# Patient Record
Sex: MALE | Race: ASIAN | NOT HISPANIC OR LATINO
[De-identification: names, ages, dates, MRNs, and addresses within clinical notes are randomized per-mention and may not be internally consistent; named-entity substitution may affect disease eponyms.]

---

## 2017-02-27 ENCOUNTER — APPOINTMENT (OUTPATIENT)
Dept: HEART AND VASCULAR | Facility: CLINIC | Age: 42
End: 2017-02-27

## 2018-02-05 ENCOUNTER — APPOINTMENT (OUTPATIENT)
Dept: HEART AND VASCULAR | Facility: CLINIC | Age: 43
End: 2018-02-05

## 2018-03-09 ENCOUNTER — TRANSCRIPTION ENCOUNTER (OUTPATIENT)
Age: 43
End: 2018-03-09

## 2018-03-09 ENCOUNTER — APPOINTMENT (OUTPATIENT)
Dept: HEART AND VASCULAR | Facility: CLINIC | Age: 43
End: 2018-03-09
Payer: COMMERCIAL

## 2018-03-09 DIAGNOSIS — D15.0 BENIGN NEOPLASM OF THYMUS: ICD-10-CM

## 2018-03-09 PROCEDURE — 99214 OFFICE O/P EST MOD 30 MIN: CPT

## 2018-03-12 PROBLEM — D15.0 THYMOMA: Status: ACTIVE | Noted: 2017-02-27

## 2018-03-29 ENCOUNTER — OUTPATIENT (OUTPATIENT)
Dept: OUTPATIENT SERVICES | Facility: HOSPITAL | Age: 43
LOS: 1 days | End: 2018-03-29

## 2018-03-29 ENCOUNTER — APPOINTMENT (OUTPATIENT)
Dept: MRI IMAGING | Facility: HOSPITAL | Age: 43
End: 2018-03-29

## 2018-04-03 PROBLEM — Z00.00 ENCOUNTER FOR PREVENTIVE HEALTH EXAMINATION: Status: ACTIVE | Noted: 2018-04-03

## 2018-04-13 ENCOUNTER — APPOINTMENT (OUTPATIENT)
Dept: MRI IMAGING | Facility: HOSPITAL | Age: 43
End: 2018-04-13

## 2018-06-04 ENCOUNTER — MOBILE ON CALL (OUTPATIENT)
Age: 43
End: 2018-06-04

## 2018-06-06 DIAGNOSIS — Z01.818 ENCOUNTER FOR OTHER PREPROCEDURAL EXAMINATION: ICD-10-CM

## 2018-06-15 PROBLEM — Z01.818 PRE-OP EVALUATION: Status: ACTIVE | Noted: 2018-06-15

## 2018-06-25 VITALS
SYSTOLIC BLOOD PRESSURE: 111 MMHG | OXYGEN SATURATION: 100 % | WEIGHT: 160.06 LBS | TEMPERATURE: 98 F | RESPIRATION RATE: 16 BRPM | DIASTOLIC BLOOD PRESSURE: 77 MMHG | HEART RATE: 67 BPM

## 2018-06-25 RX ORDER — ISAVUCONAZONIUM SULFATE 40 MG/1
2 CAPSULE ORAL
Qty: 0 | Refills: 0 | COMMUNITY

## 2018-06-25 RX ORDER — ACYCLOVIR SODIUM 500 MG
1 VIAL (EA) INTRAVENOUS
Qty: 0 | Refills: 0 | COMMUNITY

## 2018-06-25 RX ORDER — CHLORHEXIDINE GLUCONATE 213 G/1000ML
1 SOLUTION TOPICAL ONCE
Qty: 0 | Refills: 0 | Status: DISCONTINUED | OUTPATIENT
Start: 2018-06-26 | End: 2018-06-26

## 2018-06-25 NOTE — H&P ADULT - HISTORY OF PRESENT ILLNESS
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 Almodovar (no embolization performed at the time) presented to Dr. Almodovar in March of this year due to severe pain in his elbow with an inability to straighten arm more than approximately 30°. He said he's had some limitation of extension of the elbow for several years but it is much worse now and the pain is constant. He has had some physical therapy but has not found that helpful. During office visit, Dr. Almodovar reviewed plain film which showed severe arthritis in the joint likely due to recurrent hemarthrosis secondary degenerative arthritis. Patient underwent MRI (2018) revealing aneurysmal dilatation and tortuosity of the brachial artery and its branches with early reflux of multiple veins including the cephalic.  Patient now recommended LUE angiogram with possible embolization with steroid injection (to be performed by Dr. Abreu).   OF NOTE: Patient's chart says ; spoke with scheduling team and registration as well; who are working on this issue currently.

## 2018-06-25 NOTE — H&P ADULT - ASSESSMENT
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 Almodovar (no embolization performed at the time) presented to Dr. Almodovar in March of this year due to severe pain in his elbow with an inability to straighten arm more than approximately 30°. He said he's had some limitation of extension of the elbow for several years but it is much worse now and the pain is constant. He has had some physical therapy but has not found that helpful. During office visit, Dr. Almodovar reviewed plain film which showed severe arthritis in the joint likely due to recurrent hemarthrosis secondary degenerative arthritis. Patient underwent MRI (2018) revealing aneurysmal dilatation and tortuosity of the brachial artery and its branches with early reflux of multiple veins including the cephalic.  Patient now recommended LUE angiogram with possible embolization with steroid injection (to be performed by Dr. Abreu).   OF NOTE: Patient's chart says ; spoke with scheduling team, registration, bed board and help desk; who are working on this issue currently.   ASA III, Mallampati III. Pt states he has pain in elbow with movement, otherwise asymptomatic on 18 prior to surgery. EKG pre op NSR 67 bpm with 1st degree AVB. . Small q waves in II, III, AVF.

## 2018-06-25 NOTE — H&P ADULT - PSH
AVM (arteriovenous malformation)    Malignant thymoma  s/p resectionin 2016  S/P IVC filter  removed 2017

## 2018-06-26 ENCOUNTER — OUTPATIENT (OUTPATIENT)
Dept: OUTPATIENT SERVICES | Facility: HOSPITAL | Age: 43
LOS: 1 days | Discharge: NURSING, REHAB OR LONG-TERM CARE | End: 2018-06-26
Payer: COMMERCIAL

## 2018-06-26 DIAGNOSIS — Z95.828 PRESENCE OF OTHER VASCULAR IMPLANTS AND GRAFTS: Chronic | ICD-10-CM

## 2018-06-26 DIAGNOSIS — Q27.30 ARTERIOVENOUS MALFORMATION, SITE UNSPECIFIED: Chronic | ICD-10-CM

## 2018-06-26 DIAGNOSIS — C37 MALIGNANT NEOPLASM OF THYMUS: Chronic | ICD-10-CM

## 2018-06-26 LAB
ANION GAP SERPL CALC-SCNC: 12 MMOL/L — SIGNIFICANT CHANGE UP (ref 5–17)
APTT BLD: 31.4 SEC — SIGNIFICANT CHANGE UP (ref 27.5–37.4)
BASOPHILS NFR BLD AUTO: 0.6 % — SIGNIFICANT CHANGE UP (ref 0–2)
BUN SERPL-MCNC: 29 MG/DL — HIGH (ref 7–23)
CALCIUM SERPL-MCNC: 9.5 MG/DL — SIGNIFICANT CHANGE UP (ref 8.4–10.5)
CHLORIDE SERPL-SCNC: 103 MMOL/L — SIGNIFICANT CHANGE UP (ref 96–108)
CO2 SERPL-SCNC: 25 MMOL/L — SIGNIFICANT CHANGE UP (ref 22–31)
CREAT SERPL-MCNC: 1.67 MG/DL — HIGH (ref 0.5–1.3)
EOSINOPHIL NFR BLD AUTO: 2.2 % — SIGNIFICANT CHANGE UP (ref 0–6)
GLUCOSE SERPL-MCNC: 97 MG/DL — SIGNIFICANT CHANGE UP (ref 70–99)
HCT VFR BLD CALC: 40.2 % — SIGNIFICANT CHANGE UP (ref 39–50)
HGB BLD-MCNC: 12.9 G/DL — LOW (ref 13–17)
INR BLD: 1.03 — SIGNIFICANT CHANGE UP (ref 0.88–1.16)
LYMPHOCYTES # BLD AUTO: 51.4 % — HIGH (ref 13–44)
MCHC RBC-ENTMCNC: 30.9 PG — SIGNIFICANT CHANGE UP (ref 27–34)
MCHC RBC-ENTMCNC: 32.1 G/DL — SIGNIFICANT CHANGE UP (ref 32–36)
MCV RBC AUTO: 96.2 FL — SIGNIFICANT CHANGE UP (ref 80–100)
MONOCYTES NFR BLD AUTO: 10.4 % — SIGNIFICANT CHANGE UP (ref 2–14)
NEUTROPHILS NFR BLD AUTO: 35.4 % — LOW (ref 43–77)
PLATELET # BLD AUTO: 263 K/UL — SIGNIFICANT CHANGE UP (ref 150–400)
POTASSIUM SERPL-MCNC: 4.1 MMOL/L — SIGNIFICANT CHANGE UP (ref 3.5–5.3)
POTASSIUM SERPL-SCNC: 4.1 MMOL/L — SIGNIFICANT CHANGE UP (ref 3.5–5.3)
PROTHROM AB SERPL-ACNC: 11.4 SEC — SIGNIFICANT CHANGE UP (ref 9.8–12.7)
RBC # BLD: 4.18 M/UL — LOW (ref 4.2–5.8)
RBC # FLD: 14.7 % — SIGNIFICANT CHANGE UP (ref 10.3–16.9)
SODIUM SERPL-SCNC: 140 MMOL/L — SIGNIFICANT CHANGE UP (ref 135–145)
WBC # BLD: 6.9 K/UL — SIGNIFICANT CHANGE UP (ref 3.8–10.5)
WBC # FLD AUTO: 6.9 K/UL — SIGNIFICANT CHANGE UP (ref 3.8–10.5)

## 2018-06-26 PROCEDURE — 80048 BASIC METABOLIC PNL TOTAL CA: CPT

## 2018-06-26 PROCEDURE — C1889: CPT

## 2018-06-26 PROCEDURE — 85025 COMPLETE CBC W/AUTO DIFF WBC: CPT

## 2018-06-26 PROCEDURE — C1769: CPT

## 2018-06-26 PROCEDURE — 85730 THROMBOPLASTIN TIME PARTIAL: CPT

## 2018-06-26 PROCEDURE — C1894: CPT

## 2018-06-26 PROCEDURE — 86901 BLOOD TYPING SEROLOGIC RH(D): CPT

## 2018-06-26 PROCEDURE — 86900 BLOOD TYPING SEROLOGIC ABO: CPT

## 2018-06-26 PROCEDURE — 37242 VASC EMBOLIZE/OCCLUDE ARTERY: CPT

## 2018-06-26 PROCEDURE — 85610 PROTHROMBIN TIME: CPT

## 2018-06-26 PROCEDURE — 86850 RBC ANTIBODY SCREEN: CPT

## 2018-06-26 PROCEDURE — 20605 DRAIN/INJ JOINT/BURSA W/O US: CPT | Mod: LT

## 2018-06-26 RX ORDER — ONDANSETRON 8 MG/1
4 TABLET, FILM COATED ORAL EVERY 4 HOURS
Qty: 0 | Refills: 0 | Status: DISCONTINUED | OUTPATIENT
Start: 2018-06-26 | End: 2018-06-26

## 2018-08-02 PROBLEM — C37 MALIGNANT NEOPLASM OF THYMUS: Chronic | Status: ACTIVE | Noted: 2018-06-25

## 2018-08-02 PROBLEM — Q27.30 ARTERIOVENOUS MALFORMATION, SITE UNSPECIFIED: Chronic | Status: ACTIVE | Noted: 2018-06-25

## 2018-08-03 ENCOUNTER — APPOINTMENT (OUTPATIENT)
Dept: HEART AND VASCULAR | Facility: CLINIC | Age: 43
End: 2018-08-03
Payer: COMMERCIAL

## 2018-08-03 DIAGNOSIS — M25.522 PAIN IN LEFT ELBOW: ICD-10-CM

## 2018-08-03 DIAGNOSIS — M19.029 PRIMARY OSTEOARTHRITIS, UNSPECIFIED ELBOW: ICD-10-CM

## 2018-08-03 DIAGNOSIS — Q27.30 ARTERIOVENOUS MALFORMATION, SITE UNSPECIFIED: ICD-10-CM

## 2018-08-03 PROCEDURE — 99214 OFFICE O/P EST MOD 30 MIN: CPT

## 2018-08-06 PROBLEM — M19.029 ARTHRITIS OF ELBOW: Status: ACTIVE | Noted: 2018-03-12

## 2018-08-06 PROBLEM — Q27.30 AVM (ARTERIOVENOUS MALFORMATION): Status: ACTIVE | Noted: 2017-02-27

## 2018-08-06 PROBLEM — M25.522 LEFT ELBOW PAIN: Status: ACTIVE | Noted: 2018-03-12

## 2018-09-18 NOTE — BRIEF OPERATIVE NOTE - PRE-OP DX
Arthritis  09/18/2018    Active  June Britt  AVM (arteriovenous malformation)  09/18/2018    Active  June Britt E

## 2018-09-18 NOTE — BRIEF OPERATIVE NOTE - OPERATION/FINDINGS
Using direct stick technique under fluoroscopic guidance the left elbow joint space was accessed and contrast injection performed confirming proper positioning of the needle. A total of 40mg of Methylprednisolone and 2% Lidocaine solution was injection into the joint space. Entry tract occluded using Surgiflo collagen matrix. Sterile dressings applied.

## 2022-09-28 ENCOUNTER — TRANSCRIPTION ENCOUNTER (OUTPATIENT)
Age: 47
End: 2022-09-28

## 2023-04-10 ENCOUNTER — APPOINTMENT (OUTPATIENT)
Dept: HEART AND VASCULAR | Facility: CLINIC | Age: 48
End: 2023-04-10
Payer: COMMERCIAL

## 2023-04-10 PROCEDURE — 99204 OFFICE O/P NEW MOD 45 MIN: CPT

## 2023-04-11 NOTE — HISTORY OF PRESENT ILLNESS
[FreeTextEntry1] : 46yo M with thymic carcinoma s/p resection and on weekly oral chemo, s/p BMT secondary to aplastic anemia from thymic cancer in 2016 who presents with left upper extremity AVM. He's s/p embolization in 2011 and steroid injection of the elbow due to bony destruction of the AVM causing stiffness in 2018 with Dr. Almodovar and Dr. Abreu.

## 2023-04-11 NOTE — PHYSICAL EXAM
[Alert] : alert [No Acute Distress] : no acute distress [Well Nourished] : well nourished [Normal Sclera/Conjunctiva] : normal sclera/conjunctiva [PERRL] : pupils equal, round and reactive to light [EOMI] : extra occular movement intact [Normal Outer Ear/Nose] : the ears and nose were normal in appearance [Normal TMs] : both tympanic membranes were normal [Normal Hearing] : hearing was normal [No Neck Mass] : no neck mass was observed [Supple] : the neck was supple [No LAD] : no lymphadenopathy [Normal Bowel Sounds] : normal bowel sounds [Not Tender] : non-tender [Not Distended] : not distended [Cranial Nerves Intact] : cranial nerves 2-12 were intact [Normal Reflexes] : deep tendon reflexes were 2+ and symmetric [No Motor Deficits] : the motor exam was normal [Oriented x3] : oriented to person, place, and time [Normal Insight/Judgement] : insight and judgment were intact [Normal Affect] : the affect was normal [de-identified] : marked swelling of left arm and forearm compared to right. 40 degree contracture with extension of the left elbow. Palpable thrill at upper arm and forearm.

## 2023-04-11 NOTE — ASSESSMENT
[FreeTextEntry1] :  This is a 48 year old male who we've been treating since his teenage years for a diffuse high flow AVM in the left upper extremity from the chest wall to the hand. He has had multiple embolization procedures and in general has done fairly well. 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 He also has a significant medical history of a malignant thymoma for which he is being treated at [facility] with stable clinical status. He came in today for a routine follow up regarding his left arm and hand. He is not having much pain in the arm and has a longstanding moderate (approx. 30 degrees) fixed contracture at the elbow which cannot be fully extended. The hand is very functional and actually it appears better than it has in quite awhile. Interestingly, as part of his  thymoma treatment at [facility] he has been on everolimus for quite a while and this may be contributing to his improved appearance. On physical exam he has diffuse enlargement of the extremity especially in the upper arm and forearm. He has very marked pulsation in the upper arm but not so much distally. 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 He states that he had a recent echocardiogram and he's not quite sure what it showed other than “high flow”. I asked him to get us a copy of that study. If the heart is stable in size and function we can just continue to watch him. If he has a high output state with cardiomegaly we should probably try to further reduce the shunting.